# Patient Record
Sex: FEMALE | Race: BLACK OR AFRICAN AMERICAN | NOT HISPANIC OR LATINO | Employment: STUDENT | URBAN - METROPOLITAN AREA
[De-identification: names, ages, dates, MRNs, and addresses within clinical notes are randomized per-mention and may not be internally consistent; named-entity substitution may affect disease eponyms.]

---

## 2024-09-17 ENCOUNTER — OFFICE VISIT (OUTPATIENT)
Dept: URGENT CARE | Facility: CLINIC | Age: 24
End: 2024-09-17
Payer: COMMERCIAL

## 2024-09-17 VITALS
DIASTOLIC BLOOD PRESSURE: 81 MMHG | SYSTOLIC BLOOD PRESSURE: 120 MMHG | HEIGHT: 72 IN | WEIGHT: 215 LBS | OXYGEN SATURATION: 99 % | RESPIRATION RATE: 14 BRPM | BODY MASS INDEX: 29.12 KG/M2 | TEMPERATURE: 98 F | HEART RATE: 79 BPM

## 2024-09-17 DIAGNOSIS — Z76.0 MEDICATION REFILL: ICD-10-CM

## 2024-09-17 DIAGNOSIS — Z30.09 BIRTH CONTROL COUNSELING: Primary | ICD-10-CM

## 2024-09-17 LAB
B-HCG UR QL: NEGATIVE
CTP QC/QA: YES

## 2024-09-17 PROCEDURE — 81025 URINE PREGNANCY TEST: CPT | Mod: S$GLB,,, | Performed by: STUDENT IN AN ORGANIZED HEALTH CARE EDUCATION/TRAINING PROGRAM

## 2024-09-17 PROCEDURE — 99499 UNLISTED E&M SERVICE: CPT | Mod: S$GLB,,, | Performed by: STUDENT IN AN ORGANIZED HEALTH CARE EDUCATION/TRAINING PROGRAM

## 2024-09-17 RX ORDER — MOXIFLOXACIN 5 MG/ML
SOLUTION/ DROPS OPHTHALMIC
COMMUNITY
Start: 2024-07-05

## 2024-09-17 RX ORDER — NORETHINDRONE ACETATE AND ETHINYL ESTRADIOL 1MG-20(21)
1 KIT ORAL DAILY
COMMUNITY
End: 2024-09-17

## 2024-09-17 RX ORDER — NORETHINDRONE ACETATE AND ETHINYL ESTRADIOL, ETHINYL ESTRADIOL AND FERROUS FUMARATE 1MG-10(24)
1 KIT ORAL DAILY
Qty: 28 TABLET | Refills: 2 | Status: SHIPPED | OUTPATIENT
Start: 2024-09-17 | End: 2025-09-17

## 2024-09-17 RX ORDER — MUPIROCIN 20 MG/G
OINTMENT TOPICAL
COMMUNITY
Start: 2024-05-22

## 2024-09-17 NOTE — PROGRESS NOTES
"Subjective:      Patient ID: Sho Echeverria is a 23 y.o. female.    Vitals:  height is 6' 2" (1.88 m) and weight is 97.5 kg (215 lb). Her temperature is 98.3 °F (36.8 °C). Her blood pressure is 120/81 and her pulse is 79. Her respiration is 14 and oxygen saturation is 99%.     Chief Complaint: Medication Refill    Alina mejias needs a refill on her Birth control. Her pharmacy shows 2 refills but will not fill without a new prescription. Lo Loestrin FE 1-10 Tablets    Medication Refill  This is a new problem. The current episode started today. The problem occurs constantly. Pertinent negatives include no chills, coughing or fever.       Constitution: Negative for chills and fever.   Respiratory:  Negative for cough.    Genitourinary:  Negative for vaginal bleeding.      Objective:     Physical Exam   HENT:   Head: Normocephalic and atraumatic.   Nose: Nose normal.   Eyes: Conjunctivae are normal. Pupils are equal, round, and reactive to light.   Cardiovascular: Normal rate.   Pulmonary/Chest: Effort normal.   Abdominal: Normal appearance.   Neurological: She is alert.   Psychiatric: Her behavior is normal. Mood normal.     Results for orders placed or performed in visit on 09/17/24   POCT urine pregnancy   Result Value Ref Range    POC Preg Test, Ur Negative Negative     Acceptable Yes       Assessment:     1. Birth control counseling    2. Medication refill        Plan:       Birth control counseling  -     POCT urine pregnancy  -     norethindrone-e.estradioL-iron (LO LOESTRIN FE) 1 mg-10 mcg (24)/10 mcg (2) Tab; Take 1 each by mouth once daily.  Dispense: 28 tablet; Refill: 2    Medication refill  -     POCT urine pregnancy  -     norethindrone-e.estradioL-iron (LO LOESTRIN FE) 1 mg-10 mcg (24)/10 mcg (2) Tab; Take 1 each by mouth once daily.  Dispense: 28 tablet; Refill: 2      -patient will need to follow up in 3 months for repeat UPT and birth control refill    Medical Decision Making: "   Differential Diagnosis:   Birth control refill  Clinical Tests:   Lab Tests: Ordered and Reviewed  The following lab test(s) were unremarkable: UPT  Urgent Care Management:  UPT was negative. Birth control was refilled.

## 2024-11-26 ENCOUNTER — OFFICE VISIT (OUTPATIENT)
Dept: URGENT CARE | Facility: CLINIC | Age: 24
End: 2024-11-26
Payer: COMMERCIAL

## 2024-11-26 VITALS
RESPIRATION RATE: 16 BRPM | WEIGHT: 215 LBS | BODY MASS INDEX: 29.12 KG/M2 | HEART RATE: 80 BPM | SYSTOLIC BLOOD PRESSURE: 122 MMHG | HEIGHT: 72 IN | OXYGEN SATURATION: 98 % | TEMPERATURE: 99 F | DIASTOLIC BLOOD PRESSURE: 85 MMHG

## 2024-11-26 DIAGNOSIS — Z76.0 MEDICATION REFILL: ICD-10-CM

## 2024-11-26 DIAGNOSIS — Z30.011 ORAL CONTRACEPTIVE PRESCRIBED: Primary | ICD-10-CM

## 2024-11-26 DIAGNOSIS — Z30.09 BIRTH CONTROL COUNSELING: ICD-10-CM

## 2024-11-26 LAB
B-HCG UR QL: NEGATIVE
CTP QC/QA: YES

## 2024-11-26 RX ORDER — NORETHINDRONE ACETATE AND ETHINYL ESTRADIOL, ETHINYL ESTRADIOL AND FERROUS FUMARATE 1MG-10(24)
1 KIT ORAL DAILY
Qty: 28 TABLET | Refills: 2 | Status: SHIPPED | OUTPATIENT
Start: 2024-11-26 | End: 2025-11-26

## 2024-11-26 NOTE — PROGRESS NOTES
"Subjective:      Patient ID: Sho Echeverria is a 24 y.o. female.    Vitals:  height is 6' 2" (1.88 m) and weight is 97.5 kg (215 lb). Her oral temperature is 98.5 °F (36.9 °C). Her blood pressure is 122/85 and her pulse is 80. Her respiration is 16 and oxygen saturation is 98%.     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  LMP was end of October.         Medication Refill  This is a recurrent problem.     Genitourinary:  Negative for vaginal pain, vaginal discharge, vaginal bleeding, vaginal odor and genital sore.      Objective:     Physical Exam   HENT:   Head: Normocephalic and atraumatic.   Nose: Nose normal.   Eyes: Conjunctivae are normal. Pupils are equal, round, and reactive to light.   Cardiovascular: Normal rate.   Pulmonary/Chest: Effort normal.   Abdominal: Normal appearance.   Neurological: She is alert.   Psychiatric: Her behavior is normal. Mood normal.     Results for orders placed or performed in visit on 11/26/24   POCT urine pregnancy    Collection Time: 11/26/24  9:27 AM   Result Value Ref Range    POC Preg Test, Ur Negative Negative     Acceptable Yes        Assessment:     1. Oral contraceptive prescribed    2. Birth control counseling    3. Medication refill        Plan:       Oral contraceptive prescribed  -     POCT urine pregnancy    Birth control counseling  -     norethindrone-e.estradioL-iron (LO LOESTRIN FE) 1 mg-10 mcg (24)/10 mcg (2) Tab; Take 1 each by mouth once daily.  Dispense: 28 tablet; Refill: 2    Medication refill  -     norethindrone-e.estradioL-iron (LO LOESTRIN FE) 1 mg-10 mcg (24)/10 mcg (2) Tab; Take 1 each by mouth once daily.  Dispense: 28 tablet; Refill: 2      Please follow up with your primary care provider within 2-5 days if your signs and symptoms have not resolved or worsen.     If your condition worsens or fails to improve we recommend that you receive another " evaluation at the emergency room immediately or contact your primary medical clinic to discuss your concerns.   You must understand that you have received an Urgent Care treatment only and that you may be released before all of your medical problems are known or treated. You, the patient, will arrange for follow up care as instructed.        Medical Decision Making:   Differential Diagnosis:   Birth control refill  Clinical Tests:   Lab Tests: Ordered and Reviewed  The following lab test(s) were unremarkable: UPT  Urgent Care Management:  UPT negative.  Birth control refilled.

## 2025-02-19 ENCOUNTER — OFFICE VISIT (OUTPATIENT)
Dept: URGENT CARE | Facility: CLINIC | Age: 25
End: 2025-02-19
Payer: COMMERCIAL

## 2025-02-19 VITALS
HEART RATE: 89 BPM | TEMPERATURE: 98 F | WEIGHT: 215 LBS | SYSTOLIC BLOOD PRESSURE: 123 MMHG | HEIGHT: 72 IN | BODY MASS INDEX: 29.12 KG/M2 | RESPIRATION RATE: 18 BRPM | OXYGEN SATURATION: 98 % | DIASTOLIC BLOOD PRESSURE: 83 MMHG

## 2025-02-19 DIAGNOSIS — Z30.41: ICD-10-CM

## 2025-02-19 DIAGNOSIS — Z30.09 BIRTH CONTROL COUNSELING: ICD-10-CM

## 2025-02-19 DIAGNOSIS — Z30.9 ENCOUNTER FOR CONTRACEPTIVE MANAGEMENT, UNSPECIFIED TYPE: Primary | ICD-10-CM

## 2025-02-19 DIAGNOSIS — Z76.0 MEDICATION REFILL: ICD-10-CM

## 2025-02-19 DIAGNOSIS — Z30.41 ENCOUNTER FOR REPEAT PRESCRIPTION OF ORAL CONTRACEPTIVES: ICD-10-CM

## 2025-02-19 LAB
B-HCG UR QL: NEGATIVE
CTP QC/QA: YES

## 2025-02-19 RX ORDER — NORETHINDRONE ACETATE AND ETHINYL ESTRADIOL, ETHINYL ESTRADIOL AND FERROUS FUMARATE 1MG-10(24)
1 KIT ORAL DAILY
Qty: 28 TABLET | Refills: 2 | Status: SHIPPED | OUTPATIENT
Start: 2025-02-19 | End: 2026-02-19

## 2025-02-19 NOTE — PATIENT INSTRUCTIONS
Please follow up with your primary care provider within 2-5 days if your signs and symptoms have not resolved or worsen.     If your condition worsens or fails to improve we recommend that you receive another evaluation at the emergency room immediately or contact your primary medical clinic to discuss your concerns.   You must understand that you have received an Urgent Care treatment only and that you may be released before all of your medical problems are known or treated. You, the patient, will arrange for follow up care as instructed.     Please continue taking birth control pills as prescribed.   Return here in 3 months for refill.

## 2025-02-19 NOTE — PROGRESS NOTES
"Subjective:      Patient ID: Sho Echeverria is a 24 y.o. female.    Vitals:  height is 6' 2" (1.88 m) and weight is 97.5 kg (215 lb). Her oral temperature is 98.2 °F (36.8 °C). Her blood pressure is 123/83 and her pulse is 89. Her respiration is 18 and oxygen saturation is 98%.     Chief Complaint: Contraception    [employer] student presents for BC refill.  - not c/o of any other symptoms.  - tolerating BC well.   LMP 1/22/2025          Constitution: Negative for chills, sweating and fatigue.   Genitourinary:  Negative for dysuria, frequency, urgency, painful menstruation, irregular menstruation, missed menses, heavy menstrual bleeding, vaginal discharge, vaginal bleeding, vaginal odor, genital sore and pelvic pain.      Objective:     Physical Exam   Constitutional: She is oriented to person, place, and time.  Non-toxic appearance. She does not appear ill. No distress.   HENT:   Head: Normocephalic and atraumatic.   Mouth/Throat: Mucous membranes are moist.   Cardiovascular: Normal rate.   Pulmonary/Chest: Effort normal.   Abdominal: Normal appearance.   Musculoskeletal: Normal range of motion.         General: Normal range of motion.   Neurological: She is alert and oriented to person, place, and time.   Skin: Skin is warm, dry and not diaphoretic.   Psychiatric: Her behavior is normal. Mood normal.   Nursing note and vitals reviewed.      Assessment:     1. Encounter for contraceptive management, unspecified type    2. Uses oral contraceptive without problem    3. Encounter for repeat prescription of oral contraceptives    4. Birth control counseling    5. Medication refill        Plan:     Office Visit on 02/19/2025   Component Date Value Ref Range Status    POC Preg Test, Ur 02/19/2025 Negative  Negative Final     Acceptable 02/19/2025 Yes   Final         Encounter for contraceptive management, unspecified type  -     POCT urine pregnancy  -     norethindrone-e.estradioL-iron (LO LOESTRIN FE) 1 mg-10 " mcg (24)/10 mcg (2) Tab; Take 1 each by mouth once daily.  Dispense: 28 tablet; Refill: 2    Uses oral contraceptive without problem  -     norethindrone-e.estradioL-iron (LO LOESTRIN FE) 1 mg-10 mcg (24)/10 mcg (2) Tab; Take 1 each by mouth once daily.  Dispense: 28 tablet; Refill: 2    Encounter for repeat prescription of oral contraceptives  -     norethindrone-e.estradioL-iron (LO LOESTRIN FE) 1 mg-10 mcg (24)/10 mcg (2) Tab; Take 1 each by mouth once daily.  Dispense: 28 tablet; Refill: 2    Birth control counseling  -     norethindrone-e.estradioL-iron (LO LOESTRIN FE) 1 mg-10 mcg (24)/10 mcg (2) Tab; Take 1 each by mouth once daily.  Dispense: 28 tablet; Refill: 2    Medication refill  -     norethindrone-e.estradioL-iron (LO LOESTRIN FE) 1 mg-10 mcg (24)/10 mcg (2) Tab; Take 1 each by mouth once daily.  Dispense: 28 tablet; Refill: 2          Medical Decision Making:   Urgent Care Management:  Pt is a nonsmoker without reported h/o HTN, breast cancer, Migraines, CVA, VTE,current pregnancy or lactation.  She is a good candidate for continued use of Lo Loestrin Fe oral contraceptive use. POCT UPT negative today.  Plan to provide 3 month RF today with instructions to return here in 3 months for refill.    Discussed, using shared decision making, eligibility, options, risks vs benefits, advantages, potential side effects, and  administration/use with pt.   Printed pt education materials were provided to her.   Pt understands that dose of OCP can be tailored to her response.  Patient expressed verbal understanding and agreement with treatment plan.        Patient Instructions   Please follow up with your primary care provider within 2-5 days if your signs and symptoms have not resolved or worsen.     If your condition worsens or fails to improve we recommend that you receive another evaluation at the emergency room immediately or contact your primary medical clinic to discuss your concerns.   You must understand  that you have received an Urgent Care treatment only and that you may be released before all of your medical problems are known or treated. You, the patient, will arrange for follow up care as instructed.     Please continue taking birth control pills as prescribed.   Return here in 3 months for refill.